# Patient Record
Sex: MALE | Race: BLACK OR AFRICAN AMERICAN | Employment: FULL TIME | URBAN - METROPOLITAN AREA
[De-identification: names, ages, dates, MRNs, and addresses within clinical notes are randomized per-mention and may not be internally consistent; named-entity substitution may affect disease eponyms.]

---

## 2022-12-13 ENCOUNTER — HOSPITAL ENCOUNTER (EMERGENCY)
Age: 37
Discharge: HOME | End: 2022-12-13
Attending: EMERGENCY MEDICINE
Payer: COMMERCIAL

## 2022-12-13 ENCOUNTER — APPOINTMENT (OUTPATIENT)
Dept: GENERAL RADIOLOGY | Age: 37
End: 2022-12-13
Attending: EMERGENCY MEDICINE
Payer: COMMERCIAL

## 2022-12-13 VITALS
WEIGHT: 206 LBS | HEART RATE: 80 BPM | HEIGHT: 71 IN | OXYGEN SATURATION: 100 % | SYSTOLIC BLOOD PRESSURE: 129 MMHG | DIASTOLIC BLOOD PRESSURE: 78 MMHG | TEMPERATURE: 99 F | BODY MASS INDEX: 28.84 KG/M2 | RESPIRATION RATE: 17 BRPM

## 2022-12-13 DIAGNOSIS — J18.9 COMMUNITY ACQUIRED PNEUMONIA, UNSPECIFIED LATERALITY: ICD-10-CM

## 2022-12-13 DIAGNOSIS — R07.89 CHEST WALL PAIN: Primary | ICD-10-CM

## 2022-12-13 LAB
ALBUMIN SERPL-MCNC: 3.5 G/DL (ref 3.5–5)
ALBUMIN/GLOB SERPL: 0.9 {RATIO} (ref 1.1–2.2)
ALP SERPL-CCNC: 68 U/L (ref 45–117)
ALT SERPL-CCNC: 25 U/L (ref 12–78)
ANION GAP SERPL CALC-SCNC: 6 MMOL/L (ref 5–15)
AST SERPL-CCNC: 16 U/L (ref 15–37)
BASOPHILS # BLD: 0 K/UL (ref 0–0.1)
BASOPHILS NFR BLD: 1 % (ref 0–1)
BILIRUB SERPL-MCNC: 0.4 MG/DL (ref 0.2–1)
BNP SERPL-MCNC: 46 PG/ML
BUN SERPL-MCNC: 15 MG/DL (ref 6–20)
BUN/CREAT SERPL: 12 (ref 12–20)
CALCIUM SERPL-MCNC: 9.3 MG/DL (ref 8.5–10.1)
CHLORIDE SERPL-SCNC: 109 MMOL/L (ref 97–108)
CO2 SERPL-SCNC: 28 MMOL/L (ref 21–32)
CREAT SERPL-MCNC: 1.3 MG/DL (ref 0.7–1.3)
DIFFERENTIAL METHOD BLD: NORMAL
EOSINOPHIL # BLD: 0.2 K/UL (ref 0–0.4)
EOSINOPHIL NFR BLD: 2 % (ref 0–7)
ERYTHROCYTE [DISTWIDTH] IN BLOOD BY AUTOMATED COUNT: 13.8 % (ref 11.5–14.5)
GLOBULIN SER CALC-MCNC: 3.8 G/DL (ref 2–4)
GLUCOSE SERPL-MCNC: 85 MG/DL (ref 65–100)
HCT VFR BLD AUTO: 40.9 % (ref 36.6–50.3)
HGB BLD-MCNC: 12.8 G/DL (ref 12.1–17)
IMM GRANULOCYTES # BLD AUTO: 0 K/UL (ref 0–0.04)
IMM GRANULOCYTES NFR BLD AUTO: 0 % (ref 0–0.5)
LYMPHOCYTES # BLD: 3.2 K/UL (ref 0.8–3.5)
LYMPHOCYTES NFR BLD: 40 % (ref 12–49)
MCH RBC QN AUTO: 26.7 PG (ref 26–34)
MCHC RBC AUTO-ENTMCNC: 31.3 G/DL (ref 30–36.5)
MCV RBC AUTO: 85.2 FL (ref 80–99)
MONOCYTES # BLD: 0.4 K/UL (ref 0–1)
MONOCYTES NFR BLD: 5 % (ref 5–13)
NEUTS SEG # BLD: 4.2 K/UL (ref 1.8–8)
NEUTS SEG NFR BLD: 52 % (ref 32–75)
NRBC # BLD: 0 K/UL (ref 0–0.01)
NRBC BLD-RTO: 0 PER 100 WBC
PLATELET # BLD AUTO: 251 K/UL (ref 150–400)
PMV BLD AUTO: 9.8 FL (ref 8.9–12.9)
POTASSIUM SERPL-SCNC: 3.8 MMOL/L (ref 3.5–5.1)
PROT SERPL-MCNC: 7.3 G/DL (ref 6.4–8.2)
RBC # BLD AUTO: 4.8 M/UL (ref 4.1–5.7)
SODIUM SERPL-SCNC: 143 MMOL/L (ref 136–145)
TROPONIN-HIGH SENSITIVITY: 6 NG/L (ref 0–76)
WBC # BLD AUTO: 8.1 K/UL (ref 4.1–11.1)

## 2022-12-13 PROCEDURE — 99285 EMERGENCY DEPT VISIT HI MDM: CPT

## 2022-12-13 PROCEDURE — 83880 ASSAY OF NATRIURETIC PEPTIDE: CPT

## 2022-12-13 PROCEDURE — 71046 X-RAY EXAM CHEST 2 VIEWS: CPT

## 2022-12-13 PROCEDURE — 74011250636 HC RX REV CODE- 250/636: Performed by: EMERGENCY MEDICINE

## 2022-12-13 PROCEDURE — 84484 ASSAY OF TROPONIN QUANT: CPT

## 2022-12-13 PROCEDURE — 85025 COMPLETE CBC W/AUTO DIFF WBC: CPT

## 2022-12-13 PROCEDURE — 74011250637 HC RX REV CODE- 250/637: Performed by: EMERGENCY MEDICINE

## 2022-12-13 PROCEDURE — 96374 THER/PROPH/DIAG INJ IV PUSH: CPT

## 2022-12-13 PROCEDURE — 93005 ELECTROCARDIOGRAM TRACING: CPT

## 2022-12-13 PROCEDURE — 36415 COLL VENOUS BLD VENIPUNCTURE: CPT

## 2022-12-13 PROCEDURE — 96375 TX/PRO/DX INJ NEW DRUG ADDON: CPT

## 2022-12-13 PROCEDURE — 80053 COMPREHEN METABOLIC PANEL: CPT

## 2022-12-13 RX ORDER — FENTANYL CITRATE 50 UG/ML
50 INJECTION, SOLUTION INTRAMUSCULAR; INTRAVENOUS
Status: COMPLETED | OUTPATIENT
Start: 2022-12-13 | End: 2022-12-13

## 2022-12-13 RX ORDER — DOXYCYCLINE HYCLATE 100 MG
100 TABLET ORAL
Status: COMPLETED | OUTPATIENT
Start: 2022-12-13 | End: 2022-12-13

## 2022-12-13 RX ORDER — KETOROLAC TROMETHAMINE 30 MG/ML
30 INJECTION, SOLUTION INTRAMUSCULAR; INTRAVENOUS
Status: COMPLETED | OUTPATIENT
Start: 2022-12-13 | End: 2022-12-13

## 2022-12-13 RX ORDER — ONDANSETRON 2 MG/ML
4 INJECTION INTRAMUSCULAR; INTRAVENOUS
Status: COMPLETED | OUTPATIENT
Start: 2022-12-13 | End: 2022-12-13

## 2022-12-13 RX ORDER — HYDROCODONE BITARTRATE AND ACETAMINOPHEN 5; 325 MG/1; MG/1
1 TABLET ORAL
Qty: 12 TABLET | Refills: 0 | Status: SHIPPED | OUTPATIENT
Start: 2022-12-13 | End: 2022-12-16

## 2022-12-13 RX ORDER — DOXYCYCLINE HYCLATE 100 MG
100 TABLET ORAL 2 TIMES DAILY
Qty: 14 TABLET | Refills: 0 | Status: SHIPPED | OUTPATIENT
Start: 2022-12-13 | End: 2022-12-20

## 2022-12-13 RX ORDER — DIAZEPAM 5 MG/1
5 TABLET ORAL
Status: COMPLETED | OUTPATIENT
Start: 2022-12-13 | End: 2022-12-13

## 2022-12-13 RX ORDER — HYDROCODONE BITARTRATE AND ACETAMINOPHEN 5; 325 MG/1; MG/1
1 TABLET ORAL
Status: COMPLETED | OUTPATIENT
Start: 2022-12-13 | End: 2022-12-13

## 2022-12-13 RX ADMIN — KETOROLAC TROMETHAMINE 30 MG: 30 INJECTION, SOLUTION INTRAMUSCULAR; INTRAVENOUS at 11:36

## 2022-12-13 RX ADMIN — HYDROCODONE BITARTRATE AND ACETAMINOPHEN 1 TABLET: 5; 325 TABLET ORAL at 13:02

## 2022-12-13 RX ADMIN — FENTANYL CITRATE 50 MCG: 50 INJECTION, SOLUTION INTRAMUSCULAR; INTRAVENOUS at 13:02

## 2022-12-13 RX ADMIN — DOXYCYCLINE HYCLATE 100 MG: 100 TABLET, COATED ORAL at 13:02

## 2022-12-13 RX ADMIN — DIAZEPAM 5 MG: 5 TABLET ORAL at 11:36

## 2022-12-13 RX ADMIN — ONDANSETRON 4 MG: 2 INJECTION INTRAMUSCULAR; INTRAVENOUS at 13:02

## 2022-12-13 NOTE — Clinical Note
[address]  [facility] EMERGENCY DEPT  [address] [facility]  [place] [id number] [phone]    Work/School Note    Date: 12/13/2022    To Whom It May concern:    Eitan Gonzalez was seen and treated today in the emergency room by the following provider(s):  Attending Provider: Beto Thurston DO. Eitan Gonzalez is excused from work/school on 12/13/22 and 12/14/22. He is medically clear to return to work/school on 12/15/2022.        Sincerely,          Corina Sosa DO

## 2022-12-13 NOTE — ED PROVIDER NOTES
EMERGENCY DEPARTMENT HISTORY AND PHYSICAL EXAM      Date: 12/13/2022  Patient Name: Patricia Gomez    History of Presenting Illness     Chief Complaint   Patient presents with    Chest Pain       Pt arrives to the ED via EMS from work with c/o substernal CP x1 hour after lifting boxes at work. Pt reports he had a brief episode of SOB however denies at this time. Pain 10/10. Per EMS pt received 324 ASA and takes 81 mg ASA daily at home. History Provided By: Patient    HPI: Patricia Gomez, 42 y.o. male presents to the ED with cc of chest pain. Patient presents to the emergency department by EMS secondary to chest pain. Patient was at work today and was lifting boxes overhead when he had a sudden onset of pain in the chest.  He states the pain is severe in nature rated 10 out of 10 worse with palpation as well as movements. He had a brief episode where he is feeling short of breath however that has resolved. He denies any diaphoresis. There is been no abdominal pain, nausea, vomiting or diarrhea. There is been no recent pain or swelling lower extremities. He denies any fever or chills. He denies any cough or cold symptoms. There are no other complaints, changes, or physical findings at this time. PCP: Kimber Guevara NP    No current facility-administered medications on file prior to encounter. No current outpatient medications on file prior to encounter. Past History     Past Medical History:  PE     Past Surgical History:  None    Family History:  None    Social History:   No tobacco, alcohol, drugs    Allergies:  No Known Allergies      Review of Systems   Review of Systems   Constitutional: Negative. Negative for appetite change, chills, fatigue and fever. HENT: Negative. Negative for congestion, rhinorrhea, sinus pressure and sore throat. Eyes: Negative. Respiratory: Negative. Negative for cough, choking, chest tightness, shortness of breath and wheezing. Cardiovascular:  Positive for chest pain. Negative for palpitations and leg swelling. Gastrointestinal:  Negative for abdominal pain, constipation, diarrhea, nausea and vomiting. Endocrine: Negative. Genitourinary: Negative. Negative for difficulty urinating, dysuria, flank pain and urgency. Musculoskeletal: Negative. Skin: Negative. Neurological: Negative. Negative for dizziness, speech difficulty, weakness, light-headedness, numbness and headaches. Psychiatric/Behavioral: Negative. All other systems reviewed and are negative. Physical Exam   Physical Exam  Vitals and nursing note reviewed. Constitutional:       General: He is not in acute distress. Appearance: Normal appearance. He is well-developed. He is not diaphoretic. HENT:      Head: Normocephalic and atraumatic. Mouth/Throat:      Mouth: Mucous membranes are moist.      Pharynx: No oropharyngeal exudate. Eyes:      Extraocular Movements: Extraocular movements intact. Conjunctiva/sclera: Conjunctivae normal.      Pupils: Pupils are equal, round, and reactive to light. Neck:      Vascular: No JVD. Trachea: No tracheal deviation. Cardiovascular:      Rate and Rhythm: Normal rate and regular rhythm. Heart sounds: Normal heart sounds. No murmur heard. Pulmonary:      Effort: Pulmonary effort is normal. No respiratory distress. Breath sounds: Normal breath sounds. No stridor. No wheezing or rales. Chest:      Chest wall: Tenderness (left chest) present. Abdominal:      General: There is no distension. Palpations: Abdomen is soft. Tenderness: There is no abdominal tenderness. There is no guarding or rebound. Musculoskeletal:         General: Normal range of motion. Cervical back: Normal range of motion and neck supple. Right lower leg: No edema. Left lower leg: No edema. Skin:     General: Skin is warm and dry.       Capillary Refill: Capillary refill takes less than 2 seconds. Neurological:      Mental Status: He is alert and oriented to person, place, and time. Cranial Nerves: No cranial nerve deficit. Comments: No gross motor or sensory deficits    Psychiatric:         Mood and Affect: Mood normal.         Behavior: Behavior normal.       Diagnostic Study Results     Labs -     Recent Results (from the past 12 hour(s))   CBC WITH AUTOMATED DIFF    Collection Time: 12/13/22 10:30 AM   Result Value Ref Range    WBC 8.1 4.1 - 11.1 K/uL    RBC 4.80 4. 10 - 5.70 M/uL    HGB 12.8 12.1 - 17.0 g/dL    HCT 40.9 36.6 - 50.3 %    MCV 85.2 80.0 - 99.0 FL    MCH 26.7 26.0 - 34.0 PG    MCHC 31.3 30.0 - 36.5 g/dL    RDW 13.8 11.5 - 14.5 %    PLATELET 226 163 - 949 K/uL    MPV 9.8 8.9 - 12.9 FL    NRBC 0.0 0  WBC    ABSOLUTE NRBC 0.00 0.00 - 0.01 K/uL    NEUTROPHILS 52 32 - 75 %    LYMPHOCYTES 40 12 - 49 %    MONOCYTES 5 5 - 13 %    EOSINOPHILS 2 0 - 7 %    BASOPHILS 1 0 - 1 %    IMMATURE GRANULOCYTES 0 0.0 - 0.5 %    ABS. NEUTROPHILS 4.2 1.8 - 8.0 K/UL    ABS. LYMPHOCYTES 3.2 0.8 - 3.5 K/UL    ABS. MONOCYTES 0.4 0.0 - 1.0 K/UL    ABS. EOSINOPHILS 0.2 0.0 - 0.4 K/UL    ABS. BASOPHILS 0.0 0.0 - 0.1 K/UL    ABS. IMM. GRANS. 0.0 0.00 - 0.04 K/UL    DF AUTOMATED     METABOLIC PANEL, COMPREHENSIVE    Collection Time: 12/13/22 10:30 AM   Result Value Ref Range    Sodium 143 136 - 145 mmol/L    Potassium 3.8 3.5 - 5.1 mmol/L    Chloride 109 (H) 97 - 108 mmol/L    CO2 28 21 - 32 mmol/L    Anion gap 6 5 - 15 mmol/L    Glucose 85 65 - 100 mg/dL    BUN 15 6 - 20 MG/DL    Creatinine 1.30 0.70 - 1.30 MG/DL    BUN/Creatinine ratio 12 12 - 20      eGFR >60 >60 ml/min/1.73m2    Calcium 9.3 8.5 - 10.1 MG/DL    Bilirubin, total 0.4 0.2 - 1.0 MG/DL    ALT (SGPT) 25 12 - 78 U/L    AST (SGOT) 16 15 - 37 U/L    Alk.  phosphatase 68 45 - 117 U/L    Protein, total 7.3 6.4 - 8.2 g/dL    Albumin 3.5 3.5 - 5.0 g/dL    Globulin 3.8 2.0 - 4.0 g/dL    A-G Ratio 0.9 (L) 1.1 - 2.2     NT-PRO BNP Collection Time: 12/13/22 10:30 AM   Result Value Ref Range    NT pro-BNP 46 <125 PG/ML   TROPONIN-HIGH SENSITIVITY    Collection Time: 12/13/22 10:30 AM   Result Value Ref Range    Troponin-High Sensitivity 6 0 - 76 ng/L   EKG, 12 LEAD, INITIAL    Collection Time: 12/13/22 10:35 AM   Result Value Ref Range    Ventricular Rate 71 BPM    Atrial Rate 71 BPM    P-R Interval 180 ms    QRS Duration 100 ms    Q-T Interval 376 ms    QTC Calculation (Bezet) 408 ms    Calculated P Axis 33 degrees    Calculated R Axis -35 degrees    Calculated T Axis 18 degrees    Diagnosis       Normal sinus rhythm  Left axis deviation  Minimal voltage criteria for LVH, may be normal variant  Possible Anterior infarct , age undetermined  No previous ECGs available         Radiologic Studies -   XR CHEST PA LAT   Final Result      Mild interstitial opacities with bronchial wall thickening which may reflect   viral pneumonia. CT Results  (Last 48 hours)      None          CXR Results  (Last 48 hours)                 12/13/22 1104  XR CHEST PA LAT Final result    Impression:      Mild interstitial opacities with bronchial wall thickening which may reflect   viral pneumonia. Narrative:  EXAM: XR CHEST PA LAT       INDICATION: Chest pain       COMPARISON: None       TECHNIQUE: PA and lateral chest 2 views       FINDINGS: The cardiac size is within normal limits. The pulmonary vasculature is   within normal limits. Mild bronchial wall thickening and interstitial opacities. The visualized bones   and upper abdomen are age-appropriate. Medical Decision Making   I am the first provider for this patient. I reviewed the vital signs, available nursing notes, past medical history, past surgical history, family history and social history. Vital Signs-Reviewed the patient's vital signs.   Patient Vitals for the past 12 hrs:   Temp Pulse Resp BP SpO2   12/13/22 1115 -- -- -- (!) 149/99 --   12/13/22 1022 99 °F (37.2 °C) 80 17 (!) 153/100 100 %       EKG interpretation: (Preliminary)  Normal sinus rhythm, rate 71, left axis, normal HI/QRS, no acute ST changes. Records Reviewed: Nursing Notes, Old Medical Records, Previous Radiology Studies, and Previous Laboratory Studies, patient has had prior PE in the past also reports extensive hematoma requiring wound care for quite some time. Provider Notes (Medical Decision Making):   DDx- Chest wall pain, rib fx, muscle spasm    ED Course:   Initial assessment performed. The patients presenting problems have been discussed, and they are in agreement with the care plan formulated and outlined with them. I have encouraged them to ask questions as they arise throughout their visit. Based on physical exam feel patient's findings most likely to be consistent with musculoskeletal pain given reproducible pain with palpation and movement. Labs reassuring chest x-ray does show signs of possible pneumonia will elect to treat. Patient will be discharged home      Disposition:    DC- Adult Discharges: All of the diagnostic tests were reviewed and questions answered. Diagnosis, care plan and treatment options were discussed. The patient understands the instructions and will follow up as directed. The patients results have been reviewed with them. They have been counseled regarding their diagnosis. The patient and parent verbally convey understanding and agreement of the signs, symptoms, diagnosis, treatment and prognosis and additionally agrees to follow up as recommended with their PCP in 24 - 48 hours. They also agree with the care-plan and convey that all of their questions have been answered.   I have also put together some discharge instructions for them that include: 1) educational information regarding their diagnosis, 2) how to care for their diagnosis at home, as well a 3) list of reasons why they would want to return to the ED prior to their follow-up appointment, should their condition change. DISCHARGE PLAN:  1. Discharge Medication List as of 12/13/2022  1:24 PM        START taking these medications    Details   doxycycline (VIBRA-TABS) 100 mg tablet Take 1 Tablet by mouth two (2) times a day for 7 days. , Normal, Disp-14 Tablet, R-0      HYDROcodone-acetaminophen (Norco) 5-325 mg per tablet Take 1 Tablet by mouth every six (6) hours as needed for Pain for up to 3 days. Max Daily Amount: 4 Tablets., Normal, Disp-12 Tablet, R-0           2. Follow-up Information       Follow up With Specialties Details Why Contact Info    Bahman Conrad NP Nurse Practitioner  As needed [address] [facility] [address] [phone]            3. Return to ED if worse     Diagnosis     Clinical Impression:   1. Chest wall pain    2. Community acquired pneumonia, unspecified laterality        Attestations:    Maria Luz Hopkins, DO        Please note that this dictation was completed with [employer]ord[employer], the computer voice recognition software. Quite often unanticipated grammatical, syntax, homophones, and other interpretive errors are inadvertently transcribed by the computer software. Please disregard these errors. Please excuse any errors that have escaped final proofreading. Thank you.

## 2022-12-13 NOTE — ED NOTES
Patient given printed discharge instructions reviewed by the MD. Patient understands instructions/follow up recommendations. Patient discharged out of ED with family at his side.

## 2022-12-14 LAB
ATRIAL RATE: 71 BPM
CALCULATED P AXIS, ECG09: 33 DEGREES
CALCULATED R AXIS, ECG10: -35 DEGREES
CALCULATED T AXIS, ECG11: 18 DEGREES
DIAGNOSIS, 93000: NORMAL
P-R INTERVAL, ECG05: 180 MS
Q-T INTERVAL, ECG07: 376 MS
QRS DURATION, ECG06: 100 MS
QTC CALCULATION (BEZET), ECG08: 408 MS
VENTRICULAR RATE, ECG03: 71 BPM

## 2023-02-20 ENCOUNTER — HOSPITAL ENCOUNTER (OUTPATIENT)
Dept: GENERAL RADIOLOGY | Age: 38
Discharge: HOME | End: 2023-02-20
Payer: COMMERCIAL

## 2023-02-20 ENCOUNTER — TRANSCRIBE ORDER (OUTPATIENT)
Dept: REGISTRATION | Age: 38
End: 2023-02-20

## 2023-02-20 DIAGNOSIS — J18.9 UNRESOLVED PNEUMONIA: Primary | ICD-10-CM

## 2023-02-20 DIAGNOSIS — J18.9 UNRESOLVED PNEUMONIA: ICD-10-CM

## 2023-02-20 PROCEDURE — 71046 X-RAY EXAM CHEST 2 VIEWS: CPT

## 2023-06-23 ENCOUNTER — OFFICE VISIT (OUTPATIENT)
Age: 38
End: 2023-06-23

## 2023-06-23 VITALS
HEART RATE: 69 BPM | RESPIRATION RATE: 16 BRPM | SYSTOLIC BLOOD PRESSURE: 122 MMHG | TEMPERATURE: 98.5 F | DIASTOLIC BLOOD PRESSURE: 82 MMHG | HEIGHT: 71 IN | OXYGEN SATURATION: 100 % | WEIGHT: 195 LBS | BODY MASS INDEX: 27.3 KG/M2

## 2023-06-23 DIAGNOSIS — H11.31 TRAUMATIC SUBCONJUNCTIVAL HEMORRHAGE OF RIGHT EYE: Primary | ICD-10-CM

## 2023-06-23 ASSESSMENT — ENCOUNTER SYMPTOMS
FOREIGN BODY SENSATION: 0
EYE DISCHARGE: 0
PHOTOPHOBIA: 0
EYE ITCHING: 0
EYE REDNESS: 1
EYE PAIN: 0
BLURRED VISION: 0

## 2023-06-23 ASSESSMENT — VISUAL ACUITY: OU: 1
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
## 2023-06-26 ENCOUNTER — OFFICE VISIT (OUTPATIENT)
Age: 38
End: 2023-06-26

## 2023-06-26 VITALS
WEIGHT: 191 LBS | OXYGEN SATURATION: 99 % | DIASTOLIC BLOOD PRESSURE: 78 MMHG | HEART RATE: 72 BPM | BODY MASS INDEX: 26.64 KG/M2 | RESPIRATION RATE: 17 BRPM | TEMPERATURE: 97.2 F | SYSTOLIC BLOOD PRESSURE: 129 MMHG
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
## 2023-06-26 DIAGNOSIS — S50.869A INSECT BITE OF FOREARM, UNSPECIFIED LATERALITY, INITIAL ENCOUNTER: ICD-10-CM

## 2023-06-26 RX ORDER — TRIAMCINOLONE ACETONIDE 0.25 MG/G
CREAM TOPICAL
Qty: 45 G | Refills: 0 | Status: SHIPPED | OUTPATIENT
Start: 2023-06-26

## 2024-06-05 ENCOUNTER — OFFICE VISIT (OUTPATIENT)
Age: 39
End: 2024-06-05
Payer: COMMERCIAL

## 2024-06-05 ENCOUNTER — CLINICAL DOCUMENTATION (OUTPATIENT)
Age: 39
End: 2024-06-05

## 2024-06-05 VITALS
HEART RATE: 82 BPM | OXYGEN SATURATION: 97 % | SYSTOLIC BLOOD PRESSURE: 118 MMHG | BODY MASS INDEX: 26.77 KG/M2 | HEIGHT: 71 IN | RESPIRATION RATE: 16 BRPM | DIASTOLIC BLOOD PRESSURE: 82 MMHG | WEIGHT: 191.2 LBS | TEMPERATURE: 97.9 F

## 2024-06-05 DIAGNOSIS — I67.82 ISCHEMIC BRAIN INJURY: Primary | ICD-10-CM

## 2024-06-05 DIAGNOSIS — Z86.73 HISTORY OF STROKE: ICD-10-CM

## 2024-06-05 DIAGNOSIS — M21.372 LEFT FOOT DROP: ICD-10-CM

## 2024-06-05 DIAGNOSIS — G83.14 MONOPARESIS OF LOWER EXTREMITY AFFECTING LEFT NONDOMINANT SIDE (HCC): ICD-10-CM

## 2024-06-05 DIAGNOSIS — R20.2 PARESTHESIA OF LEFT FOOT: ICD-10-CM

## 2024-06-05 DIAGNOSIS — I69.392 FACIAL DROOP AS LATE EFFECT OF CEREBROVASCULAR ACCIDENT (CVA): ICD-10-CM

## 2024-06-05 PROBLEM — T50.901A DRUG OVERDOSE: Status: RESOLVED | Noted: 2024-03-13 | Resolved: 2024-06-05

## 2024-06-05 PROBLEM — R78.81 BACTEREMIA: Status: ACTIVE | Noted: 2024-03-13

## 2024-06-05 PROBLEM — J96.01 ACUTE RESPIRATORY FAILURE WITH HYPOXIA AND HYPERCARBIA (HCC): Status: ACTIVE | Noted: 2024-03-08

## 2024-06-05 PROBLEM — R78.81 BACTEREMIA: Status: RESOLVED | Noted: 2024-03-13 | Resolved: 2024-06-05

## 2024-06-05 PROBLEM — I10 HYPERTENSION: Status: ACTIVE | Noted: 2024-03-15

## 2024-06-05 PROBLEM — J96.02 ACUTE RESPIRATORY FAILURE WITH HYPOXIA AND HYPERCARBIA (HCC): Status: RESOLVED | Noted: 2024-03-08 | Resolved: 2024-06-05

## 2024-06-05 PROBLEM — E78.5 HYPERLIPIDEMIA: Status: ACTIVE | Noted: 2024-03-15

## 2024-06-05 PROBLEM — J96.01 ACUTE RESPIRATORY FAILURE WITH HYPOXIA AND HYPERCARBIA (HCC): Status: RESOLVED | Noted: 2024-03-08 | Resolved: 2024-06-05

## 2024-06-05 PROBLEM — T50.901A DRUG OVERDOSE: Status: ACTIVE | Noted: 2024-03-13

## 2024-06-05 PROBLEM — J96.02 ACUTE RESPIRATORY FAILURE WITH HYPOXIA AND HYPERCARBIA (HCC): Status: ACTIVE | Noted: 2024-03-08

## 2024-06-05 PROCEDURE — 99205 OFFICE O/P NEW HI 60 MIN: CPT | Performed by: PSYCHIATRY & NEUROLOGY

## 2024-06-05 PROCEDURE — 3079F DIAST BP 80-89 MM HG: CPT | Performed by: PSYCHIATRY & NEUROLOGY

## 2024-06-05 PROCEDURE — 3074F SYST BP LT 130 MM HG: CPT | Performed by: PSYCHIATRY & NEUROLOGY

## 2024-06-05 RX ORDER — LISINOPRIL 5 MG/1
5 TABLET ORAL
COMMUNITY

## 2024-06-05 RX ORDER — AMLODIPINE BESYLATE 5 MG/1
5 TABLET ORAL DAILY
COMMUNITY

## 2024-06-05 ASSESSMENT — PATIENT HEALTH QUESTIONNAIRE - PHQ9
SUM OF ALL RESPONSES TO PHQ QUESTIONS 1-9: 0
1. LITTLE INTEREST OR PLEASURE IN DOING THINGS: NOT AT ALL
2. FEELING DOWN, DEPRESSED OR HOPELESS: NOT AT ALL
SUM OF ALL RESPONSES TO PHQ QUESTIONS 1-9: 0
SUM OF ALL RESPONSES TO PHQ9 QUESTIONS 1 & 2: 0

## 2024-06-05 NOTE — ASSESSMENT & PLAN NOTE
For now would like to focus on other more acute processes.  But might have to consider doing EMG blood will reassess at next office visit

## 2024-06-05 NOTE — ASSESSMENT & PLAN NOTE
Does not seem to be disrupting functional ability.  Unclear how much of this will resolve with time

## 2024-06-05 NOTE — ASSESSMENT & PLAN NOTE
Reviewed [facility]'s notes and reviewed the results of the studies today office with the patient and his mother    Continue with physical therapy at this time  We will add a prescription for cognitive therapy  Neuropsych testing has been ordered  Repeat MRI ordered  EEG ordered

## 2024-06-05 NOTE — ASSESSMENT & PLAN NOTE
Patient presently in physical therapy.  He is to continue with that.  We will see if he is able to gain any further improvement
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
## 2024-06-05 NOTE — PATIENT INSTRUCTIONS
As a reminder:   Please come to your appointment 15 minutes before your office appointment.  This way, you can get checked in at the  and checked in by the nursing staff so you have the full allotment of time with your provider for your visit.  Please bring an up-to-date and accurate list of all your medications.  Or bring all your active prescription bottles with you at the time of your office visit and this includes over-the-counter medications so we can make sure that your medication list is up-to-date.  If you are scheduled for a virtual visit, please be aware that the  will need to check you in and usually the day before to verify insurance and collect co-pays as appropriate.  Please be prepared for the second call which will be from the nurse to go over your medications and any other vital information.  This will probably be done 30 minutes prior to your visit.  The reason why we do this early is that you can get the full benefit of your appointment time with your provider.  Finally you will be given the link for your virtual visit please click into your link 10 minutes prior to your appointment and please wait patiently for the provider to join you        As per discussion  Show we have ordered some testing to include an EEG MRI scan the cognitive therapy and the neuropsych testing    Take the prescription for the cognitive therapy to [facility] where you are getting your physical therapy if they can do it great if not go to [facility] they can do the therapy    If you get on my chart I will be able to go over the results of the testing as it comes in briefly and anything differently we need to do based on those results before I see you back    You may or may not have the neuropsych testing done before I see you back but I definitely want to check in with you in 6 months as you will have the other testing completed                  Office Policies    Phone calls/patient messages:  Please

## 2024-06-05 NOTE — PROGRESS NOTES
Faxed last office note and referral to [facility] for speech therapy to [phone] and received fax confirmation.

## 2024-06-24 ENCOUNTER — HOSPITAL ENCOUNTER (OUTPATIENT)
Facility: HOSPITAL | Age: 39
Discharge: HOME | End: 2024-06-27
Payer: COMMERCIAL

## 2024-06-24 DIAGNOSIS — I67.82 ISCHEMIC BRAIN INJURY: ICD-10-CM

## 2024-06-24 PROCEDURE — 70553 MRI BRAIN STEM W/O & W/DYE: CPT

## 2024-06-24 PROCEDURE — 6360000004 HC RX CONTRAST MEDICATION: Performed by: PSYCHIATRY & NEUROLOGY

## 2024-06-24 PROCEDURE — A9579 GAD-BASE MR CONTRAST NOS,1ML: HCPCS | Performed by: PSYCHIATRY & NEUROLOGY

## 2024-06-24 RX ADMIN — GADOTERIDOL 18 ML: 279.3 INJECTION, SOLUTION INTRAVENOUS at 15:44

## 2024-07-10 ENCOUNTER — HOSPITAL ENCOUNTER (OUTPATIENT)
Facility: HOSPITAL | Age: 39
Discharge: HOME | End: 2024-07-13
Payer: COMMERCIAL

## 2024-07-10 DIAGNOSIS — I67.82 ISCHEMIC BRAIN INJURY: ICD-10-CM

## 2024-07-10 PROCEDURE — 95714 VEEG EA 12-26 HR UNMNTR: CPT

## 2024-07-11 PROBLEM — R41.82 ACUTE ALTERATION IN MENTAL STATUS: Status: ACTIVE | Noted: 2024-07-11

## 2024-07-11 PROBLEM — R56.9 CONVULSIONS (HCC): Status: ACTIVE | Noted: 2024-07-11

## 2024-07-12 NOTE — PROCEDURES
[place] [id number]                                   EEG      PATIENT NAME: MONICA GARRIDO      : 1985  MED REC NO: [id number]                       ROOM:   ACCOUNT NO: [id number]                       ADMIT DATE: 07/10/2024  PROVIDER: Paolo Johnson MD    DATE OF SERVICE:  07/10/2024    REFERRING PHYSICIAN:  ANGELINA RONDON    TYPE OF STUDY:  24-hour ambulatory EEG recording.    DATE OF STUDY:  07/10/2024 to 2024.    CLINICAL INDICATION:  The patient is a 38-year-old male with a history of altered mental status and possible seizures.  Patient had a passing-out episode.  The patient with possible anoxic brain injury.  EEG to rule out seizures, rule out cortical abnormality, and rule out epilepsy.    EEG CLASSIFICATION:  This patient had an essentially normal ambulatory 24-hour EEG recording.    DESCRIPTION OF THE RECORD:  This is a 16-channel prolonged EEG recording on the patient with continuous EKG monitoring to evaluate for altered mental status and possible passing-out spells, possible seizures, possible convulsive syncope.  This study began at approximately 1:30 p.m. on July 10, 2024, and ended at approximately 7:50 a.m. on 2024, for a total time of study of 18 hours and 20 minutes.  During this time, the patient had no clear areas of focal slowing.  No clear spike or spike-and-wave discharges.  No recorded electrographic or dysrhythmic spells of any type seen.  The patient did have a posteriorly located occipital alpha rhythm of 8-9 hertz that did attenuate some with eye opening.  The background rhythms at times were somewhat borderline, but probably essentially normal.  The patient did have some mild movement, muscle, and electrode artifact seen in the recording.  Overall, the study was technically of good quality and interpretable.  Hyperventilation was not performed.  Photic stimulation was not

## 2024-07-25 ENCOUNTER — TELEPHONE (OUTPATIENT)
Age: 39
End: 2024-07-25

## 2024-07-25 NOTE — TELEPHONE ENCOUNTER
Returned call to the patient's sister. Informed that at this time the patient has the next available appt. He has been added to the wait list and will be contacted if a sooner appt becomes available.

## 2024-07-25 NOTE — TELEPHONE ENCOUNTER
Patient sister, Nehal is asking if Dr. Chu has any sooner appts as she is trying to get it done for disability purposes. Please call her back at . Thank you.

## 2024-08-07 ENCOUNTER — TELEPHONE (OUTPATIENT)
Age: 39
End: 2024-08-07

## 2024-08-07 NOTE — TELEPHONE ENCOUNTER
Patients mother Faith called stating that pts EEG on 7/10 at [facility] was denied and she would like to discuss why. She can be reached at [phone]

## 2024-08-08 NOTE — TELEPHONE ENCOUNTER
Confirmed speaking to patient's mother Faith.  Faith states that she received a letter from [employer] that denied the patient's eeg.  Found that a letter was in Media but do not know who received this letter.  It was scanned into the media but never reviewed by provider in this office.      Forwarding for review by Linda in Denial folder and placed on her desk.   Patient did receive the eeg.

## 2024-08-08 NOTE — TELEPHONE ENCOUNTER
Confirmed speaking to mother (cleo).  Advised that a letter of medical neccessity has been faxed to the insurance [employer].  Advised that we ordered eeg not a video of the brain.  Received verbal understanding.

## 2024-10-03 ENCOUNTER — OFFICE VISIT (OUTPATIENT)
Age: 39
End: 2024-10-03
Payer: COMMERCIAL

## 2024-10-03 VITALS
BODY MASS INDEX: 31.64 KG/M2 | OXYGEN SATURATION: 99 % | WEIGHT: 226 LBS | HEIGHT: 71 IN | RESPIRATION RATE: 16 BRPM | DIASTOLIC BLOOD PRESSURE: 60 MMHG | SYSTOLIC BLOOD PRESSURE: 110 MMHG | HEART RATE: 65 BPM | TEMPERATURE: 97.3 F

## 2024-10-03 DIAGNOSIS — M21.372 LEFT FOOT DROP: ICD-10-CM

## 2024-10-03 DIAGNOSIS — I69.392 FACIAL DROOP AS LATE EFFECT OF CEREBROVASCULAR ACCIDENT (CVA): ICD-10-CM

## 2024-10-03 DIAGNOSIS — G83.14 MONOPARESIS OF LOWER EXTREMITY AFFECTING LEFT NONDOMINANT SIDE (HCC): ICD-10-CM

## 2024-10-03 DIAGNOSIS — I67.82 ISCHEMIC BRAIN INJURY: Primary | ICD-10-CM

## 2024-10-03 PROCEDURE — 3078F DIAST BP <80 MM HG: CPT | Performed by: PSYCHIATRY & NEUROLOGY

## 2024-10-03 PROCEDURE — 3074F SYST BP LT 130 MM HG: CPT | Performed by: PSYCHIATRY & NEUROLOGY

## 2024-10-03 PROCEDURE — 99215 OFFICE O/P EST HI 40 MIN: CPT | Performed by: PSYCHIATRY & NEUROLOGY

## 2024-10-03 RX ORDER — ASPIRIN 81 MG/1
81 TABLET ORAL DAILY
COMMUNITY

## 2024-10-03 ASSESSMENT — PATIENT HEALTH QUESTIONNAIRE - PHQ9
SUM OF ALL RESPONSES TO PHQ QUESTIONS 1-9: 0
SUM OF ALL RESPONSES TO PHQ QUESTIONS 1-9: 0
SUM OF ALL RESPONSES TO PHQ9 QUESTIONS 1 & 2: 0
SUM OF ALL RESPONSES TO PHQ QUESTIONS 1-9: 0
2. FEELING DOWN, DEPRESSED OR HOPELESS: NOT AT ALL
SUM OF ALL RESPONSES TO PHQ QUESTIONS 1-9: 0
1. LITTLE INTEREST OR PLEASURE IN DOING THINGS: NOT AT ALL

## 2024-10-03 NOTE — PROGRESS NOTES
delayed responses and using simple sentences only.    Continues with some left foot drop.     They are wondering about the results of the EEG and the MRI scan.  These were again reviewed in the office today    Behavior  New concern having some rage type behaviors they are wondering about group therapy  It was also reported that the patient is back hanging out with the same folks that he was hanging out with before this happened which family believes was the cause of the situation to be a month      Pertinent diagnostic data    24-hour EEG completed 7/10/2024 through 7/11/2024 interpreted by Dr. Johnson  INTERPRETATION: This is a somewhat borderline, but probably essentially normal electroencephalogram showing no clear areas of focal slowing, no clear spike or spike-and-wave discharges. No electrographic or dysrhythmic spells of any type seen. Clinical correlation is recommended.         Results of pertinent testing work through [facility] March 2024  Lab work from March 2024 reviewed through [facility] access shows liver and kidney functions within normal limits    MRI scan March 2024 done at [facility]: Bilateral basal ganglia stroke    24-hour EEG video monitoring completed while in the ICU at [facility] March 2024 with medications on board to include midazolam propofol and fentanyl: No evidence of seizure activity      MRI BRAIN W WO CONTRAST 06/24/2024    Narrative  EXAM:  MRI BRAIN W WO CONTRAST    Clinical history: Cerebral ischemia  INDICATION:   Cerebral ischemia    COMPARISON: None  TECHNIQUE: MR examination of the brain includes axial and sagittal T1 , axial  T2, axial FLAIR, axial gradient echo, axial DWI, coronal T1 . Pre and post  contrast axial T1-weighted imaging. Postcontrast T1-weighted imaging coronal  plane.    CONTRAST: ProHance  FINDINGS:  There is no intracranial mass, hemorrhage or acute infarction.  IACs are symmetric. Few scattered foci of increased FLAIR signal intensity in  the cerebral white matter likely of no

## 2024-10-03 NOTE — PATIENT INSTRUCTIONS
As a reminder:   Please come to your appointment 15 minutes before your office appointment.  This way, you can get checked in at the  and checked in by the nursing staff so you have the full allotment of time with your provider for your visit.  Please bring an up-to-date and accurate list of all your medications.  Or bring all your active prescription bottles with you at the time of your office visit and this includes over-the-counter medications so we can make sure that your medication list is up-to-date.  If you are scheduled for a virtual visit, please be aware that the  will need to check you in and usually the day before to verify insurance and collect co-pays as appropriate.  Please be prepared for the second call which will be from the nurse to go over your medications and any other vital information.  This will probably be done 30 minutes prior to your visit.  The reason why we do this early is that you can get the full benefit of your appointment time with your provider.  Finally you will be given the link for your virtual visit please click into your link 10 minutes prior to your appointment and please wait patiently for the provider to join you        As per discussion  I would recommend that you have 1 neurology practice only.  Will going to keep your appointment with me in December and then after that I would recommend staying within the [facility] system because they have more resources available.    Keep your appointment with Dr. Chu  I know you have concerns about possibly moving that up.  That may not be possible but you can touch base with his scheduling  and asked to be put on the cancellation list.  You can contact their office at [phone]      I would contact the neurology group at [facility] that you have seen to ask about group therapy resources and you can also contact the National brain injury foundation to see what resources are in our community.    To reiterate the

## 2024-10-03 NOTE — ASSESSMENT & PLAN NOTE
Much improved although still present does not seem to interfere with gait or ambulatory issues at this time.  Does not need AFO or additional PT at this time

## 2024-10-03 NOTE — ASSESSMENT & PLAN NOTE
Continues with cognitive slowing speech therapy has been ordered    Most recent MRI scan is reported as normal.   Most recent EEG does not show any seizure or seizure-like activity no significant pathology was identified  This was again reviewed at today's office visit    Have recommended the family contact [facility] for additional resources perhaps related to group therapy sessions for traumatic/anoxic brain injury and also contact the National brain injury center for local resources as well.     We discussed realistic long-term expectations and we also discussed that the first year is most important time in terms of recovery    Finally we talked about applying for disability they were given initial paperwork when they left [facility] and I have encouraged them to fill that out but I have also encouraged the patient to perhaps look for some volunteer positions at places like the [facility], the \[facility]\"" etc.

## 2024-10-03 NOTE — ASSESSMENT & PLAN NOTE
Much improved and only has a subtle foot drop on the left at this point.  Ambulation appears normal    I suspect things will continue to improve but it is always possible that he will have a mild foot drop chronically.    Will continue to monitor this over time I do not believe he needs an AFO nor do I believe he needs physical therapy at this point

## 2024-12-16 ENCOUNTER — OFFICE VISIT (OUTPATIENT)
Age: 39
End: 2024-12-16
Payer: COMMERCIAL

## 2024-12-16 VITALS
BODY MASS INDEX: 32.64 KG/M2 | WEIGHT: 234 LBS | SYSTOLIC BLOOD PRESSURE: 124 MMHG | OXYGEN SATURATION: 99 % | DIASTOLIC BLOOD PRESSURE: 86 MMHG | HEART RATE: 78 BPM

## 2024-12-16 DIAGNOSIS — I69.392 FACIAL DROOP AS LATE EFFECT OF CEREBROVASCULAR ACCIDENT (CVA): ICD-10-CM

## 2024-12-16 DIAGNOSIS — M21.372 LEFT FOOT DROP: ICD-10-CM

## 2024-12-16 DIAGNOSIS — R20.2 PARESTHESIA OF LEFT FOOT: ICD-10-CM

## 2024-12-16 DIAGNOSIS — I67.82 ISCHEMIC BRAIN INJURY: Primary | ICD-10-CM

## 2024-12-16 DIAGNOSIS — H93.19 TINNITUS, UNSPECIFIED LATERALITY: ICD-10-CM

## 2024-12-16 DIAGNOSIS — G83.14 MONOPARESIS OF LOWER EXTREMITY AFFECTING LEFT NONDOMINANT SIDE (HCC): ICD-10-CM

## 2024-12-16 PROBLEM — R56.9 CONVULSIONS (HCC): Status: RESOLVED | Noted: 2024-07-11 | Resolved: 2024-12-16

## 2024-12-16 PROBLEM — R41.82 ACUTE ALTERATION IN MENTAL STATUS: Status: RESOLVED | Noted: 2024-07-11 | Resolved: 2024-12-16

## 2024-12-16 PROCEDURE — 99214 OFFICE O/P EST MOD 30 MIN: CPT | Performed by: PSYCHIATRY & NEUROLOGY

## 2024-12-16 PROCEDURE — 3074F SYST BP LT 130 MM HG: CPT | Performed by: PSYCHIATRY & NEUROLOGY

## 2024-12-16 PROCEDURE — 3079F DIAST BP 80-89 MM HG: CPT | Performed by: PSYCHIATRY & NEUROLOGY

## 2024-12-16 RX ORDER — PREGABALIN 50 MG/1
CAPSULE ORAL
COMMUNITY

## 2024-12-16 ASSESSMENT — ANXIETY QUESTIONNAIRES
5. BEING SO RESTLESS THAT IT IS HARD TO SIT STILL: NOT AT ALL
3. WORRYING TOO MUCH ABOUT DIFFERENT THINGS: NOT AT ALL
2. NOT BEING ABLE TO STOP OR CONTROL WORRYING: NOT AT ALL
1. FEELING NERVOUS, ANXIOUS, OR ON EDGE: NOT AT ALL
4. TROUBLE RELAXING: NOT AT ALL
7. FEELING AFRAID AS IF SOMETHING AWFUL MIGHT HAPPEN: NOT AT ALL
6. BECOMING EASILY ANNOYED OR IRRITABLE: NOT AT ALL
GAD7 TOTAL SCORE: 0

## 2024-12-16 ASSESSMENT — PATIENT HEALTH QUESTIONNAIRE - PHQ9
2. FEELING DOWN, DEPRESSED OR HOPELESS: NOT AT ALL
SUM OF ALL RESPONSES TO PHQ QUESTIONS 1-9: 0
1. LITTLE INTEREST OR PLEASURE IN DOING THINGS: NOT AT ALL
SUM OF ALL RESPONSES TO PHQ QUESTIONS 1-9: 0
SUM OF ALL RESPONSES TO PHQ9 QUESTIONS 1 & 2: 0

## 2024-12-16 NOTE — PROGRESS NOTES
Speech therapy is helping per patient, is still doing this  Mentions his hearing is poor, mentions he does have on and off ringing in his ears, has not seen ENT,    
(LYRICA) 50 MG capsule TAKE 1 CAPSULE BY MOUTH 2 TIMES DAILY AS NEEDED (FOR PARESTHESIA).      aspirin 81 MG EC tablet Take 1 tablet by mouth daily      amLODIPine (NORVASC) 5 MG tablet Take 1 tablet by mouth daily      lisinopril (PRINIVIL;ZESTRIL) 5 MG tablet Take 1 tablet by mouth      atorvastatin (LIPITOR) 20 MG tablet Take 1 tablet by mouth nightly       No current facility-administered medications for this visit.        Past medical history/surgical history, family history, and social history have been reviewed for today's visit      ROS    A ten system review of constitutional, cardiovascular, respiratory, musculoskeletal, endocrine, skin, SHEENT, genitourinary, psychiatric and neurologic systems was obtained and is unremarkable except as mentioned under HPI    Review of Systems   HENT:  Positive for tinnitus.         Also complains of possible hearing loss   All other systems reviewed and are negative.         EXAMINATION:     Vitals:    12/16/24 0910   BP: 124/86   Site: Left Upper Arm   Position: Sitting   Cuff Size: Large Adult   Pulse: 78   SpO2: 99%   Weight: 106.1 kg (234 lb)                12/16/2024     9:18 AM   PHQ-9    Little interest or pleasure in doing things 0   Feeling down, depressed, or hopeless 0   PHQ-2 Score 0   PHQ-9 Total Score 0        General:  Patient is well-developed and well-nourished in no apparent distress  Affect is appropriate   Normocephalic without evidence of trauma  Heart sounds normal no additional sounds heard breath sounds clear to auscultation no cervical masses or bruits appreciated  No tenderness in the head or neck area to palpation  Good peripheral pulses no edema is noted  No rashes unusual bruising or bleeding lacerations are atypical discoloration appreciated on general inspection    Neuro Exam  Mental status  Alert and appropriate  Patient with slowed cognitive processing and very simple responses otherwise cognition appears intact to casual observation  Able

## 2024-12-16 NOTE — PATIENT INSTRUCTIONS
Findings:  - Lungs are clear.  - Heart sounds are normal.  - Improvement in walking, though still experiencing left foot drop.  - Speech therapy is beneficial.  - No cane or walking stick used for longer distances, but advised to use one.    Diagnosis:  - Ischemic brain injury  - Left lower extremity monoparesis  - Residual facial droop  - Left foot drop  - Paresthesias of the left foot  - Speech difficulties    Treatment Plan:  - Continue speech therapy.  - Continue home exercises from physical therapy.  - Use a cane or walking stick for longer distances.  - Schedule an ENT evaluation for potential hearing concerns and tinnitus.  - Consider reading enjoyable, non-taxing books.  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    Follow-Up Instructions:  - Follow up in 6 months.  - Continue with speech therapy.  - Use a cane or walking stick for support during longer walks.  - Schedule an ENT evaluation for hearing concerns.  - Keep the appointment with Dr. Chu in February 2025.  - Stay active and continue home exercises.  - Look into scholarship options at the [facility] for exercise programs.    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  - Encouraged to read books that are enjoyable and not too taxing on the brain.  - Advised to stay active and pace himself to avoid fatigue.                  Office Policies    Phone calls/patient messages:  Please allow up to 72 hours  for someone in the office to contact you about your call or message. Be mindful your provider may be out of the office or your message may require further review. We encourage you to use [employer] for your messages as this is a faster, more efficient way to communicate with our office    Medication Refills:  Prescription medications require up to 48 business hours to process. We encourage you to use 15Five for your refills.     For controlled medications:

## 2025-02-04 ENCOUNTER — OFFICE VISIT (OUTPATIENT)
Age: 40
End: 2025-02-04
Payer: COMMERCIAL

## 2025-02-04 DIAGNOSIS — I67.82 ISCHEMIC BRAIN INJURY: Primary | ICD-10-CM

## 2025-02-04 DIAGNOSIS — R41.9 NEUROCOGNITIVE DISORDER: ICD-10-CM

## 2025-02-04 DIAGNOSIS — F43.23 ADJUSTMENT DISORDER WITH MIXED ANXIETY AND DEPRESSED MOOD: ICD-10-CM

## 2025-02-04 PROCEDURE — 90791 PSYCH DIAGNOSTIC EVALUATION: CPT | Performed by: CLINICAL NEUROPSYCHOLOGIST

## 2025-02-04 NOTE — PROGRESS NOTES
Intake Note      Patient Name: Viral El  YOB: 1985    Age: 39 y.o.  Date of Intake: 2/5/2025   Education: 13 Ethnicity Black   Gender: Male Referring Provider: DOMINGO Gann     REASON FOR REFERRAL AND EVALUATION PROCEDURES:  Viral El  was referred for evaluation by his Neurology Provider to assist in differential diagnosis and individualized treatment planning. he understood the rationale and procedures for evaluation, as well as the limits to confidentiality, and agreed to participate. he consented to have this report made available to his  treating providers through his  electronic medical records.   History Sources: Patient, Relative (mother and sister), and Medical Record    HISTORY OF PRESENT ILLNESS:  The patient is a 39-year-old male with pertinent medical history noted for hyperlipidemia, hypertension, ischemic brain injury, stroke, and facial droop as late effect of cerebrovascular accident.  The patient presented for clinical interview accompanied by his sister.  His mother was also contacted by phone at one point during our conversation for clarification about details.  According to the patient's family, the patient was born approximately 1 month premature but he met developmental milestones appropriately.  The patient's mother and sister sister described the patient to be a typically developing boy through elementary school.  They denied academic and social difficulties in early childhood and elementary school.  However, in middle school, he reportedly became \"more quiet\" and her friends used to ask if he was \"slow.\"  They denied exposure to trauma at home around this time but reported the patient was bullied by his peers.  The patient reportedly struggled with math throughout his education and he received tutoring for math but he typically earned \"B's and C's\" and he graduated from high school without special-education services.  He also completed a

## 2025-02-17 ENCOUNTER — TELEPHONE (OUTPATIENT)
Age: 40
End: 2025-02-17

## 2025-02-17 NOTE — TELEPHONE ENCOUNTER
Patient's mother, Faith (legal guardian), called to r/s patients testing appt on 2/20 due to the weather. Please advise. [phone]

## 2025-02-28 ENCOUNTER — PROCEDURE VISIT (OUTPATIENT)
Age: 40
End: 2025-02-28

## 2025-02-28 DIAGNOSIS — F43.23 ADJUSTMENT DISORDER WITH MIXED ANXIETY AND DEPRESSED MOOD: ICD-10-CM

## 2025-02-28 DIAGNOSIS — R41.9 NEUROCOGNITIVE DISORDER: ICD-10-CM

## 2025-02-28 DIAGNOSIS — I67.82 ISCHEMIC BRAIN INJURY: Primary | ICD-10-CM

## 2025-03-04 ENCOUNTER — PROCEDURE VISIT (OUTPATIENT)
Age: 40
End: 2025-03-04
Payer: COMMERCIAL

## 2025-03-04 DIAGNOSIS — F03.90 MAJOR NEUROCOGNITIVE DISORDER (HCC): Primary | ICD-10-CM

## 2025-03-04 DIAGNOSIS — Z86.73 HISTORY OF STROKE: ICD-10-CM

## 2025-03-04 DIAGNOSIS — F43.21 ADJUSTMENT DISORDER WITH DEPRESSED MOOD: ICD-10-CM

## 2025-03-04 DIAGNOSIS — I67.82 ISCHEMIC BRAIN INJURY: ICD-10-CM

## 2025-03-04 PROCEDURE — 96132 NRPSYC TST EVAL PHYS/QHP 1ST: CPT | Performed by: CLINICAL NEUROPSYCHOLOGIST

## 2025-03-04 PROCEDURE — 96133 NRPSYC TST EVAL PHYS/QHP EA: CPT | Performed by: CLINICAL NEUROPSYCHOLOGIST

## 2025-03-04 PROCEDURE — 96138 PSYCL/NRPSYC TECH 1ST: CPT | Performed by: CLINICAL NEUROPSYCHOLOGIST

## 2025-03-04 PROCEDURE — 96139 PSYCL/NRPSYC TST TECH EA: CPT | Performed by: CLINICAL NEUROPSYCHOLOGIST

## 2025-03-19 ENCOUNTER — OFFICE VISIT (OUTPATIENT)
Age: 40
End: 2025-03-19
Payer: COMMERCIAL

## 2025-03-19 DIAGNOSIS — Z86.73 HISTORY OF STROKE: ICD-10-CM

## 2025-03-19 DIAGNOSIS — F03.90 MAJOR NEUROCOGNITIVE DISORDER (HCC): Primary | ICD-10-CM

## 2025-03-19 DIAGNOSIS — I67.82 ISCHEMIC BRAIN INJURY: ICD-10-CM

## 2025-03-19 DIAGNOSIS — F43.21 ADJUSTMENT DISORDER WITH DEPRESSED MOOD: ICD-10-CM

## 2025-03-19 PROCEDURE — 96132 NRPSYC TST EVAL PHYS/QHP 1ST: CPT | Performed by: CLINICAL NEUROPSYCHOLOGIST

## 2025-03-19 NOTE — PROGRESS NOTES
Testing session 1/2.  Second testing session scheduled for 3/4/2025.  Report will be attached to that encounter.

## 2025-03-19 NOTE — PROGRESS NOTES
the patient in future financial and healthcare decisions. Information regarding these issues can be found at www.caringinfo.org or www.agingwithdignity.org/5wishes.html  Interview and testing revealed clinically significant symptoms of psychopathology. The patient would likely benefit from initiating counseling/psychotherapy services. There are several resources for finding a therapist, including the patient's insurance company but also the American Psychological Association's Psychologist  (.apa.org), therapist  at psychology today (therapists[email or web address].com), and the American Board of Professional Psychology (ABPP.org).   The patient will be provided with psychoeducation regarding empirically determined modifiable risk and protective factors for cognitive decline. Specifically:  The patient will be encouraged to engage in approximately 2.5 hours of physician approved physical activity on a weekly basis.  The patient will be encouraged to maintain a healthy diet. they will also be informed of the Mediterranean diet, which has been associated with reduced risk for neurodegenerative conditions as well as heart disease.  The patient will be encouraged to maintain a cognitively stimulated lifestyle, also incorporating social interaction.    TIME:  Diagnostic interview: 0900 - 0935 = 35 minutes  Testing by technician Day 1: 1234 - 1430 = 116 minutes  Testing by technician Day 2: 0812 - 1023 = 131 minutes  Scoring by technician: 67 minutes  Neuropsychological testing evaluation services*: 223 minutes    BILLING:  90791 x 1 Unit  96138 x 1 Unit  96139 x 9 Units  96132 x 1 Unit  96133 x 3 Units    *Neuropsychological testing evaluation services include: Integration of patient data, interpretation of standardized test results and clinical data, clinical decision-making, treatment planning and report, and interactive feedback to the patient, family member(s) or caregiver(s), when performed.

## 2025-03-25 ENCOUNTER — HOSPITAL ENCOUNTER (EMERGENCY)
Facility: HOSPITAL | Age: 40
Discharge: HOME | End: 2025-03-25
Attending: EMERGENCY MEDICINE
Payer: COMMERCIAL

## 2025-03-25 ENCOUNTER — APPOINTMENT (OUTPATIENT)
Facility: HOSPITAL | Age: 40
End: 2025-03-25
Payer: COMMERCIAL

## 2025-03-25 VITALS
SYSTOLIC BLOOD PRESSURE: 130 MMHG | OXYGEN SATURATION: 98 % | HEIGHT: 71 IN | BODY MASS INDEX: 32.64 KG/M2 | DIASTOLIC BLOOD PRESSURE: 87 MMHG | TEMPERATURE: 98 F | HEART RATE: 74 BPM | RESPIRATION RATE: 18 BRPM

## 2025-03-25 DIAGNOSIS — R55 SYNCOPE, UNSPECIFIED SYNCOPE TYPE: Primary | ICD-10-CM

## 2025-03-25 LAB
ALBUMIN SERPL-MCNC: 3.6 G/DL (ref 3.5–5)
ALBUMIN/GLOB SERPL: 0.8 (ref 1.1–2.2)
ALP SERPL-CCNC: 103 U/L (ref 45–117)
ALT SERPL-CCNC: 35 U/L (ref 12–78)
ANION GAP SERPL CALC-SCNC: 6 MMOL/L (ref 2–12)
AST SERPL-CCNC: 17 U/L (ref 15–37)
BASOPHILS # BLD: 0.04 K/UL (ref 0–0.1)
BASOPHILS NFR BLD: 0.4 % (ref 0–1)
BILIRUB SERPL-MCNC: 0.7 MG/DL (ref 0.2–1)
BUN SERPL-MCNC: 11 MG/DL (ref 6–20)
BUN/CREAT SERPL: 9 (ref 12–20)
CALCIUM SERPL-MCNC: 9.9 MG/DL (ref 8.5–10.1)
CHLORIDE SERPL-SCNC: 106 MMOL/L (ref 97–108)
CO2 SERPL-SCNC: 26 MMOL/L (ref 21–32)
COMMENT:: NORMAL
CREAT SERPL-MCNC: 1.26 MG/DL (ref 0.7–1.3)
DIFFERENTIAL METHOD BLD: ABNORMAL
EKG ATRIAL RATE: 78 BPM
EKG DIAGNOSIS: NORMAL
EKG P AXIS: 5 DEGREES
EKG P-R INTERVAL: 176 MS
EKG Q-T INTERVAL: 374 MS
EKG QRS DURATION: 86 MS
EKG QTC CALCULATION (BAZETT): 426 MS
EKG R AXIS: 58 DEGREES
EKG T AXIS: -6 DEGREES
EKG VENTRICULAR RATE: 78 BPM
EOSINOPHIL # BLD: 0.13 K/UL (ref 0–0.4)
EOSINOPHIL NFR BLD: 1.4 % (ref 0–7)
ERYTHROCYTE [DISTWIDTH] IN BLOOD BY AUTOMATED COUNT: 14.6 % (ref 11.5–14.5)
GLOBULIN SER CALC-MCNC: 4.3 G/DL (ref 2–4)
GLUCOSE BLD STRIP.AUTO-MCNC: 122 MG/DL (ref 65–117)
GLUCOSE SERPL-MCNC: 125 MG/DL (ref 65–100)
HCT VFR BLD AUTO: 42.9 % (ref 36.6–50.3)
HGB BLD-MCNC: 13.2 G/DL (ref 12.1–17)
IMM GRANULOCYTES # BLD AUTO: 0.04 K/UL (ref 0–0.04)
IMM GRANULOCYTES NFR BLD AUTO: 0.4 % (ref 0–0.5)
LYMPHOCYTES # BLD: 3.77 K/UL (ref 0.8–3.5)
LYMPHOCYTES NFR BLD: 39.2 % (ref 12–49)
MAGNESIUM SERPL-MCNC: 2.1 MG/DL (ref 1.6–2.4)
MCH RBC QN AUTO: 25.8 PG (ref 26–34)
MCHC RBC AUTO-ENTMCNC: 30.8 G/DL (ref 30–36.5)
MCV RBC AUTO: 84 FL (ref 80–99)
MONOCYTES # BLD: 0.53 K/UL (ref 0–1)
MONOCYTES NFR BLD: 5.5 % (ref 5–13)
NEUTS SEG # BLD: 5.11 K/UL (ref 1.8–8)
NEUTS SEG NFR BLD: 53.1 % (ref 32–75)
NRBC # BLD: 0 K/UL (ref 0–0.01)
NRBC BLD-RTO: 0 PER 100 WBC
PLATELET # BLD AUTO: 320 K/UL (ref 150–400)
PMV BLD AUTO: 9.9 FL (ref 8.9–12.9)
POTASSIUM SERPL-SCNC: 4.2 MMOL/L (ref 3.5–5.1)
PROT SERPL-MCNC: 7.9 G/DL (ref 6.4–8.2)
RBC # BLD AUTO: 5.11 M/UL (ref 4.1–5.7)
SERVICE CMNT-IMP: ABNORMAL
SODIUM SERPL-SCNC: 138 MMOL/L (ref 136–145)
SPECIMEN HOLD: NORMAL
TROPONIN I SERPL HS-MCNC: 5 NG/L (ref 0–76)
WBC # BLD AUTO: 9.6 K/UL (ref 4.1–11.1)

## 2025-03-25 PROCEDURE — 82962 GLUCOSE BLOOD TEST: CPT

## 2025-03-25 PROCEDURE — 83735 ASSAY OF MAGNESIUM: CPT

## 2025-03-25 PROCEDURE — 70450 CT HEAD/BRAIN W/O DYE: CPT

## 2025-03-25 PROCEDURE — 99284 EMERGENCY DEPT VISIT MOD MDM: CPT

## 2025-03-25 PROCEDURE — 2580000003 HC RX 258

## 2025-03-25 PROCEDURE — 80053 COMPREHEN METABOLIC PANEL: CPT

## 2025-03-25 PROCEDURE — 96374 THER/PROPH/DIAG INJ IV PUSH: CPT

## 2025-03-25 PROCEDURE — 6360000002 HC RX W HCPCS

## 2025-03-25 PROCEDURE — 84484 ASSAY OF TROPONIN QUANT: CPT

## 2025-03-25 PROCEDURE — 93005 ELECTROCARDIOGRAM TRACING: CPT

## 2025-03-25 PROCEDURE — 85025 COMPLETE CBC W/AUTO DIFF WBC: CPT

## 2025-03-25 RX ORDER — KETOROLAC TROMETHAMINE 30 MG/ML
15 INJECTION, SOLUTION INTRAMUSCULAR; INTRAVENOUS ONCE
Status: COMPLETED | OUTPATIENT
Start: 2025-03-25 | End: 2025-03-25

## 2025-03-25 RX ORDER — 0.9 % SODIUM CHLORIDE 0.9 %
1000 INTRAVENOUS SOLUTION INTRAVENOUS ONCE
Status: COMPLETED | OUTPATIENT
Start: 2025-03-25 | End: 2025-03-25

## 2025-03-25 RX ADMIN — SODIUM CHLORIDE 1000 ML: 0.9 INJECTION, SOLUTION INTRAVENOUS at 13:01

## 2025-03-25 RX ADMIN — KETOROLAC TROMETHAMINE 15 MG: 30 INJECTION, SOLUTION INTRAMUSCULAR at 13:02

## 2025-03-25 ASSESSMENT — PAIN SCALES - GENERAL
PAINLEVEL_OUTOF10: 0
PAINLEVEL_OUTOF10: 10

## 2025-03-25 ASSESSMENT — PAIN - FUNCTIONAL ASSESSMENT
PAIN_FUNCTIONAL_ASSESSMENT: 0-10
PAIN_FUNCTIONAL_ASSESSMENT: PREVENTS OR INTERFERES SOME ACTIVE ACTIVITIES AND ADLS

## 2025-03-25 ASSESSMENT — PAIN DESCRIPTION - DESCRIPTORS: DESCRIPTORS: THROBBING;ACHING

## 2025-03-25 ASSESSMENT — PAIN DESCRIPTION - LOCATION: LOCATION: FOOT

## 2025-03-25 ASSESSMENT — PAIN DESCRIPTION - ORIENTATION: ORIENTATION: LEFT

## 2025-03-25 NOTE — PROGRESS NOTES
Chief complaint: Patient presented for review of previously completed neuropsychological evaluation and discussion of impressions/recommendations.    Prior to seeing the patient for today's visit, I reviewed pertinent records, including the previously completed report, the records in Epic, and any updated visits from other providers since the patient's last visit.    I provided feedback services related to the previously completed report. Attendees included: Patient and sister . Education was provided regarding my diagnostic impressions, and we discussed treatment plan/options. Attendees were provided with the opportunity to ask questions, which were answered to the best of my ability.    We discussed, in detail, the following:  Reviewed findings from evaluation including test results, diagnosis, and suspected contributing factors  Discussed recommendations outlined in report  Answered questions to the best of my ability    The patient needs to:   Follow up with referring provider for ongoing management, Initiate psychotherapy using resources (See handout), Use practical strategies to compensate for cognitive weaknesses (See handout), Emphasize modifiable risk and protective factors for cognitive functioning (e.g., exercise, diet, cognitive stimulation; see handout), and Access community resources we discussed for additional support (See handout)    The patient had the following reactions to recommendations: The patient and his sister reported understanding results and recommendations    The patient had the following concerns which I deferred to their referring provider:   meds for mood    ASSESSMENT:   Diagnosis Orders   1. Major neurocognitive disorder (HCC)        2. Ischemic brain injury        3. History of stroke        4. Adjustment disorder with depressed mood            TIME SPENT PROVIDING SERVICES:   32 minutes     BILLING:  96132 x 1 Units    *Code 05735 Neuropsychological testing evaluation services

## 2025-03-25 NOTE — ED PROVIDER NOTES
[facility] EMERGENCY DEPARTMENT  EMERGENCY DEPARTMENT ENCOUNTER      Pt Name: Viral El  MRN: [id number]  Birthdate 1985  Date of evaluation: 3/25/2025  Provider: Russell Cox PA-C    CHIEF COMPLAINT       Chief Complaint   Patient presents with    Dizziness    Loss of Consciousness         HISTORY OF PRESENT ILLNESS   (Location/Symptom, Timing/Onset, Context/Setting, Quality, Duration, Modifying Factors, Severity)  Note limiting factors.   39-year-old male with a past medical history of CVA presents with complaint of syncope.  Family member reports they were upstairs visiting a cousin when patient passed out.  He states he got really hot and dizzy which she described as a room spinning sensation.  Then he fell like his vision started to blur and he passed out.  He was seated and did not fall or hit his head.  He is not currently on any blood thinners.  He reports feeling back to his normal self now other than a mild headache.  He has residual left-sided weakness due to the past stroke.  He denies any new weakness, numbness, tingling.  Family member reports he has not eaten anything since last night.    The history is provided by the patient and a relative.         Review of External Medical Records:     Nursing Notes were reviewed.    REVIEW OF SYSTEMS    (2-9 systems for level 4, 10 or more for level 5)     Review of Systems    Except as noted above the remainder of the review of systems was reviewed and negative.       PAST MEDICAL HISTORY     Past Medical History:   Diagnosis Date    Acute alteration in mental status 07/11/2024    Acute respiratory failure with hypoxia and hypercarbia (HCC) 03/08/2024    Last Assessment & Plan:    Formatting of this note might be different from the original.   In the setting of drug overdose, patient found unresponsive and hypoxic/hypercapnic requiring intubation and ultimately ECMO (3/9-3/13); further complicated by aspiration pna   - 3/8 Chest CT:

## 2025-03-25 NOTE — ED TRIAGE NOTES
Patient was upstairs visiting a family member and he became dizzy and lost consciousness. He has not eaten since last night. Prior stroke last year. No new weakness reported.

## 2025-08-05 ENCOUNTER — OFFICE VISIT (OUTPATIENT)
Age: 40
End: 2025-08-05
Payer: MEDICAID

## 2025-08-05 VITALS
HEIGHT: 71 IN | BODY MASS INDEX: 31.3 KG/M2 | SYSTOLIC BLOOD PRESSURE: 130 MMHG | DIASTOLIC BLOOD PRESSURE: 90 MMHG | WEIGHT: 223.6 LBS | RESPIRATION RATE: 18 BRPM | HEART RATE: 76 BPM | OXYGEN SATURATION: 96 %

## 2025-08-05 DIAGNOSIS — M21.372 LEFT FOOT DROP: ICD-10-CM

## 2025-08-05 DIAGNOSIS — G83.14 MONOPARESIS OF LOWER EXTREMITY AFFECTING LEFT NONDOMINANT SIDE (HCC): ICD-10-CM

## 2025-08-05 DIAGNOSIS — R20.2 PARESTHESIA OF LEFT FOOT: ICD-10-CM

## 2025-08-05 DIAGNOSIS — I67.82 ISCHEMIC BRAIN INJURY: Primary | ICD-10-CM

## 2025-08-05 DIAGNOSIS — I10 HYPERTENSION, UNSPECIFIED TYPE: ICD-10-CM

## 2025-08-05 DIAGNOSIS — H93.19 TINNITUS, UNSPECIFIED LATERALITY: ICD-10-CM

## 2025-08-05 DIAGNOSIS — I69.392 FACIAL DROOP AS LATE EFFECT OF CEREBROVASCULAR ACCIDENT (CVA): ICD-10-CM

## 2025-08-05 PROCEDURE — 3080F DIAST BP >= 90 MM HG: CPT | Performed by: PSYCHIATRY & NEUROLOGY

## 2025-08-05 PROCEDURE — 99214 OFFICE O/P EST MOD 30 MIN: CPT | Performed by: PSYCHIATRY & NEUROLOGY

## 2025-08-05 PROCEDURE — G2211 COMPLEX E/M VISIT ADD ON: HCPCS | Performed by: PSYCHIATRY & NEUROLOGY

## 2025-08-05 PROCEDURE — 3075F SYST BP GE 130 - 139MM HG: CPT | Performed by: PSYCHIATRY & NEUROLOGY

## 2025-08-05 ASSESSMENT — PATIENT HEALTH QUESTIONNAIRE - PHQ9
SUM OF ALL RESPONSES TO PHQ QUESTIONS 1-9: 0
1. LITTLE INTEREST OR PLEASURE IN DOING THINGS: NOT AT ALL
SUM OF ALL RESPONSES TO PHQ QUESTIONS 1-9: 0
2. FEELING DOWN, DEPRESSED OR HOPELESS: NOT AT ALL
SUM OF ALL RESPONSES TO PHQ QUESTIONS 1-9: 0
SUM OF ALL RESPONSES TO PHQ QUESTIONS 1-9: 0